# Patient Record
Sex: FEMALE | URBAN - METROPOLITAN AREA
[De-identification: names, ages, dates, MRNs, and addresses within clinical notes are randomized per-mention and may not be internally consistent; named-entity substitution may affect disease eponyms.]

---

## 2020-12-24 ENCOUNTER — APPOINTMENT (RX ONLY)
Dept: URBAN - METROPOLITAN AREA CLINIC 303 | Facility: CLINIC | Age: 30
Setting detail: DERMATOLOGY
End: 2020-12-24

## 2020-12-24 DIAGNOSIS — Z00.00 ENCOUNTER FOR GENERAL ADULT MEDICAL EXAMINATION WITHOUT ABNORMAL FINDINGS: ICD-10-CM

## 2020-12-24 PROBLEM — Z71.1 PERSON WITH FEARED HEALTH COMPLAINT IN WHOM NO DIAGNOSIS IS MADE: Status: ACTIVE | Noted: 2020-12-24

## 2020-12-24 PROCEDURE — ? COUNSELING

## 2020-12-24 PROCEDURE — 99202 OFFICE O/P NEW SF 15 MIN: CPT

## 2020-12-24 PROCEDURE — ? ADDITIONAL NOTES

## 2020-12-24 ASSESSMENT — LOCATION DETAILED DESCRIPTION DERM: LOCATION DETAILED: RIGHT ANTERIOR PROXIMAL THIGH

## 2020-12-24 ASSESSMENT — LOCATION ZONE DERM: LOCATION ZONE: LEG

## 2020-12-24 ASSESSMENT — LOCATION SIMPLE DESCRIPTION DERM: LOCATION SIMPLE: RIGHT THIGH

## 2020-12-24 NOTE — PROCEDURE: MIPS QUALITY
Detail Level: Detailed
Quality 110: Preventive Care And Screening: Influenza Immunization: Influenza Immunization Administered during Influenza season
Quality 226: Preventive Care And Screening: Tobacco Use: Screening And Cessation Intervention: Patient screened for tobacco use and is an ex/non-smoker
Quality 431: Preventive Care And Screening: Unhealthy Alcohol Use - Screening: Patient screened for unhealthy alcohol use using a single question and scores less than 2 times per year
Quality 130: Documentation Of Current Medications In The Medical Record: Current Medications with Name, Dosage, Frequency, or Route not Documented, Reason not Given
Quality 128: Preventive Care And Screening: Body Mass Index (Bmi) Screening And Follow-Up Plan: BMI is documented within normal parameters and no follow-up plan is required.

## 2020-12-24 NOTE — PROCEDURE: ADDITIONAL NOTES
Additional Notes: After discussion of the risks, benefits and limitations with respect to this Telehealth visit, including potential limitations in picture and video quality, the patient has given verbal consent to proceed with this Telehealth visit. Interactive audio and video telecommunications were used to permit real-time communication between myself and the patient.  This Telehealth visit was performed due to the national COVID-19 emergency and recommended social distancing.
Detail Level: Simple
Additional Notes: -no lesions present today.  She describes very itchy red bumps that come and go without treatment in random parts of the body- upper thighs, behind her ears etc for almost 1 year.  Lives with  and 2 kids they do not have similar rash.  She is concerned for parasites and has seen a naturopath that suggested she has overgrowth of yeast in her gut.  Had allergy skin prick testing last year that showed slight elevation with gluten.  She has seen a gastroenterologist and workup came back negative - she thinks she was tested for parasites.  Poor historian, unable to get more details on these workups or names of past providers.   \\n-Recommended to avoid gluten as she has been tested for it and the results were elevated per patient and to schedule a same-day appointment when rash is present.  \\n-we discussed working with a counselor to help her with her anxiety.  She is teary on the phone and very concerned regarding parasites which is affecting her QOL.